# Patient Record
Sex: FEMALE | Race: WHITE | NOT HISPANIC OR LATINO | URBAN - METROPOLITAN AREA
[De-identification: names, ages, dates, MRNs, and addresses within clinical notes are randomized per-mention and may not be internally consistent; named-entity substitution may affect disease eponyms.]

---

## 2019-02-12 ENCOUNTER — FOLLOW UP ESTABLISHED (OUTPATIENT)
Dept: URBAN - METROPOLITAN AREA CLINIC 64 | Facility: CLINIC | Age: 59
End: 2019-02-12
Payer: MEDICARE

## 2019-02-12 PROCEDURE — 92012 INTRM OPH EXAM EST PATIENT: CPT | Performed by: OPTOMETRIST

## 2019-02-12 ASSESSMENT — KERATOMETRY
OD: 47.63
OS: 48.01

## 2019-05-31 ENCOUNTER — FOLLOW UP ESTABLISHED (OUTPATIENT)
Dept: URBAN - METROPOLITAN AREA CLINIC 64 | Facility: CLINIC | Age: 59
End: 2019-05-31
Payer: COMMERCIAL

## 2019-05-31 PROCEDURE — 92015 DETERMINE REFRACTIVE STATE: CPT | Performed by: OPTOMETRIST

## 2019-05-31 PROCEDURE — BRUDE BRUDER EYE MOIST COMPRESS: CUSTOM | Performed by: OPTOMETRIST

## 2019-05-31 PROCEDURE — 92014 COMPRE OPH EXAM EST PT 1/>: CPT | Performed by: OPTOMETRIST

## 2019-05-31 ASSESSMENT — INTRAOCULAR PRESSURE
OS: 14
OD: 14

## 2019-05-31 ASSESSMENT — KERATOMETRY
OS: 48.08
OD: 47.74

## 2019-05-31 ASSESSMENT — VISUAL ACUITY
OD: 20/20
OS: 20/20

## 2020-08-05 ENCOUNTER — FOLLOW UP ESTABLISHED (OUTPATIENT)
Dept: URBAN - METROPOLITAN AREA CLINIC 64 | Facility: CLINIC | Age: 60
End: 2020-08-05
Payer: COMMERCIAL

## 2020-08-05 DIAGNOSIS — H04.123 DRY EYE SYNDROME OF BILATERAL LACRIMAL GLANDS: ICD-10-CM

## 2020-08-05 PROCEDURE — 92014 COMPRE OPH EXAM EST PT 1/>: CPT | Performed by: OPTOMETRIST

## 2020-08-05 PROCEDURE — 92015 DETERMINE REFRACTIVE STATE: CPT | Performed by: OPTOMETRIST

## 2020-08-05 ASSESSMENT — KERATOMETRY
OS: 48.08
OD: 47.57

## 2020-08-05 ASSESSMENT — VISUAL ACUITY
OS: 20/20
OD: 20/20

## 2020-08-05 ASSESSMENT — INTRAOCULAR PRESSURE
OD: 14
OS: 14

## 2020-09-18 ENCOUNTER — NEW PATIENT (OUTPATIENT)
Dept: URBAN - METROPOLITAN AREA CLINIC 64 | Facility: CLINIC | Age: 60
End: 2020-09-18
Payer: MEDICARE

## 2020-09-18 DIAGNOSIS — H11.32 SUBCONJUNCTIVAL HEMORRHAGE OF LEFT EYE: Primary | ICD-10-CM

## 2020-09-18 PROCEDURE — 92002 INTRM OPH EXAM NEW PATIENT: CPT | Performed by: STUDENT IN AN ORGANIZED HEALTH CARE EDUCATION/TRAINING PROGRAM

## 2021-03-05 ENCOUNTER — FOLLOW UP ESTABLISHED (OUTPATIENT)
Dept: URBAN - METROPOLITAN AREA CLINIC 64 | Facility: CLINIC | Age: 61
End: 2021-03-05
Payer: MEDICARE

## 2021-03-05 DIAGNOSIS — H25.13 AGE-RELATED NUCLEAR CATARACT, BILATERAL: Primary | ICD-10-CM

## 2021-03-05 PROCEDURE — 99212 OFFICE O/P EST SF 10 MIN: CPT | Performed by: STUDENT IN AN ORGANIZED HEALTH CARE EDUCATION/TRAINING PROGRAM

## 2021-03-05 ASSESSMENT — INTRAOCULAR PRESSURE
OS: 14
OD: 14

## 2021-10-11 ENCOUNTER — OFFICE VISIT (OUTPATIENT)
Dept: URBAN - METROPOLITAN AREA CLINIC 64 | Facility: CLINIC | Age: 61
End: 2021-10-11
Payer: COMMERCIAL

## 2021-10-11 DIAGNOSIS — H52.223 REGULAR ASTIGMATISM, BILATERAL: Primary | ICD-10-CM

## 2021-10-11 PROCEDURE — 92014 COMPRE OPH EXAM EST PT 1/>: CPT | Performed by: OPTOMETRIST

## 2021-10-11 ASSESSMENT — INTRAOCULAR PRESSURE
OD: 13
OS: 14

## 2021-10-11 ASSESSMENT — VISUAL ACUITY
OD: 20/20
OS: 20/20

## 2021-10-11 ASSESSMENT — KERATOMETRY
OS: 48.11
OD: 47.67

## 2022-04-07 ENCOUNTER — OFFICE VISIT (OUTPATIENT)
Dept: URBAN - METROPOLITAN AREA CLINIC 64 | Facility: CLINIC | Age: 62
End: 2022-04-07
Payer: MEDICARE

## 2022-04-07 DIAGNOSIS — H16.223 KERATOCONJUNCTIVITIS SICCA, BILATERAL: ICD-10-CM

## 2022-04-07 DIAGNOSIS — H11.153 PINGUECULA, BILATERAL: Primary | ICD-10-CM

## 2022-04-07 DIAGNOSIS — H02.34 BLEPHAROCHALASIS OF LEFT UPPER LID: ICD-10-CM

## 2022-04-07 PROCEDURE — BRUDE BRUDER EYE MOIST COMPRESS: CUSTOM | Performed by: OPTOMETRIST

## 2022-04-07 PROCEDURE — 99213 OFFICE O/P EST LOW 20 MIN: CPT | Performed by: OPTOMETRIST

## 2022-04-07 NOTE — IMPRESSION/PLAN
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 Plan: Blepharochalasis OU. She is interested in a surgical consult.

## 2022-04-07 NOTE — IMPRESSION/PLAN
Impression: Pinguecula, bilateral: H11.153. Plan: Pt. ed. on findings. Reassured.   Use UV protection

## 2022-04-07 NOTE — IMPRESSION/PLAN
Impression: Keratoconjunctivitis sicca, bilateral: P77.977. Plan: Continue with PF tears frequently and daily hot compresses. Ok to use vaseline around the eyes for moisturizer.

## 2022-04-27 ENCOUNTER — TESTING ONLY (OUTPATIENT)
Dept: URBAN - METROPOLITAN AREA CLINIC 64 | Facility: CLINIC | Age: 62
End: 2022-04-27
Payer: MEDICARE

## 2022-04-27 DIAGNOSIS — H02.34 BLEPHAROCHALASIS LEFT UPPER EYELID: Primary | ICD-10-CM

## 2022-04-27 PROCEDURE — 92081 LIMITED VISUAL FIELD XM: CPT | Performed by: OPTOMETRIST

## 2022-05-31 ENCOUNTER — TESTING ONLY (OUTPATIENT)
Dept: URBAN - METROPOLITAN AREA CLINIC 64 | Facility: CLINIC | Age: 62
End: 2022-05-31
Payer: MEDICARE

## 2022-05-31 DIAGNOSIS — H02.34 BLEPHAROCHALASIS LEFT UPPER EYELID: Primary | ICD-10-CM

## 2022-05-31 PROCEDURE — 92081 LIMITED VISUAL FIELD XM: CPT | Performed by: OPTOMETRIST

## 2022-10-18 ENCOUNTER — OFFICE VISIT (OUTPATIENT)
Dept: URBAN - METROPOLITAN AREA CLINIC 64 | Facility: CLINIC | Age: 62
End: 2022-10-18
Payer: MEDICARE

## 2022-10-18 DIAGNOSIS — H16.223 KERATOCONJUNCTIVITIS SICCA, BILATERAL: ICD-10-CM

## 2022-10-18 DIAGNOSIS — H02.34 BLEPHAROCHALASIS OF LEFT UPPER LID: Primary | ICD-10-CM

## 2022-10-18 DIAGNOSIS — H52.223 REGULAR ASTIGMATISM, BILATERAL: ICD-10-CM

## 2022-10-18 DIAGNOSIS — H25.13 AGE-RELATED NUCLEAR CATARACT, BILATERAL: ICD-10-CM

## 2022-10-18 DIAGNOSIS — H52.4 PRESBYOPIA: ICD-10-CM

## 2022-10-18 PROCEDURE — 99213 OFFICE O/P EST LOW 20 MIN: CPT | Performed by: OPTOMETRIST

## 2022-10-18 ASSESSMENT — VISUAL ACUITY: OD: 20/20

## 2022-10-18 ASSESSMENT — INTRAOCULAR PRESSURE
OS: 10
OD: 10

## 2022-10-18 NOTE — IMPRESSION/PLAN
Impression: Age-related nuclear cataract, bilateral: H25.13. Plan: Early and mild. No treatment required due to level of vision. Monitor annually.

## 2022-10-18 NOTE — IMPRESSION/PLAN
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 Plan: Blepharochalasis OU. Patient had 59 bleph VF in April 2022 and repeat in May 2022 at patient request. She also had a previous consult with Dr. Maciel Anthony. She is interested in a surgical consult with Dr. Marycruz Kothari.

## 2022-10-18 NOTE — IMPRESSION/PLAN
Impression: Keratoconjunctivitis sicca, bilateral: D44.073. Plan: Discussed. Continue with ATs and warm compresses. She uses Lumify occasionally for redness.

## 2022-12-06 ENCOUNTER — OFFICE VISIT (OUTPATIENT)
Dept: URBAN - METROPOLITAN AREA CLINIC 64 | Facility: CLINIC | Age: 62
End: 2022-12-06
Payer: MEDICARE

## 2022-12-06 DIAGNOSIS — H02.831 DERMATOCHALASIS OF RIGHT UPPER EYELID: Primary | ICD-10-CM

## 2022-12-06 DIAGNOSIS — H02.834 DERMATOCHALASIS OF LEFT UPPER EYELID: ICD-10-CM

## 2022-12-06 DIAGNOSIS — Z41.1 ENCOUNTER FOR COSMETIC SURGERY: ICD-10-CM

## 2022-12-06 PROCEDURE — 99214 OFFICE O/P EST MOD 30 MIN: CPT | Performed by: STUDENT IN AN ORGANIZED HEALTH CARE EDUCATION/TRAINING PROGRAM

## 2022-12-06 PROCEDURE — 92285 EXTERNAL OCULAR PHOTOGRAPHY: CPT | Performed by: STUDENT IN AN ORGANIZED HEALTH CARE EDUCATION/TRAINING PROGRAM

## 2022-12-06 NOTE — IMPRESSION/PLAN
Impression: Encounter for cosmetic surgery: Z41.1. Plan: The patient inquired about improving the appearance of bags and puffiness in the lower eyelids. We discussed cosmetic lower lid blepharoplasty w/ fat removal and repositioning. I explained the dual nature of lower lid bags, specifically the fact that I can improve the fat component via surgery, however the physiologic fluid fluctuation is not amenable to surgery, and will continue. Discussed the R/B/A of this procedure, all questions were answered. The patient inquired about improving fine lines/wrinkles and skin tone in the periorbital region; we discussed cosmetic CO2 laser resurfacing of the region. Patient requested to see Dr. Caitlin Agarwal for second opinion. Will arrange for patient to see Dr. Caitlin Agarwal in [place], next available.

## 2024-02-21 ENCOUNTER — OFFICE VISIT (OUTPATIENT)
Dept: URBAN - METROPOLITAN AREA CLINIC 64 | Facility: LOCATION | Age: 64
End: 2024-02-21
Payer: MEDICARE

## 2024-02-21 DIAGNOSIS — H16.223 KERATOCONJUNCTIVITIS SICCA, BILATERAL: ICD-10-CM

## 2024-02-21 DIAGNOSIS — H02.34 BLEPHAROCHALASIS OF LEFT UPPER LID: ICD-10-CM

## 2024-02-21 DIAGNOSIS — H25.13 AGE-RELATED NUCLEAR CATARACT, BILATERAL: Primary | ICD-10-CM

## 2024-02-21 DIAGNOSIS — H02.831 DERMATOCHALASIS OF RIGHT UPPER EYELID: ICD-10-CM

## 2024-02-21 PROCEDURE — 99214 OFFICE O/P EST MOD 30 MIN: CPT | Performed by: OPTOMETRIST

## 2024-02-21 ASSESSMENT — VISUAL ACUITY
OS: 20/25
OD: 20/25

## 2024-02-21 ASSESSMENT — KERATOMETRY
OD: 47.80
OS: 47.84

## 2024-02-21 ASSESSMENT — INTRAOCULAR PRESSURE
OS: 11
OD: 10

## 2024-03-22 ENCOUNTER — OFFICE VISIT (OUTPATIENT)
Dept: URBAN - METROPOLITAN AREA CLINIC 64 | Facility: LOCATION | Age: 64
End: 2024-03-22
Payer: MEDICARE

## 2024-03-22 DIAGNOSIS — H25.13 AGE-RELATED NUCLEAR CATARACT, BILATERAL: Primary | ICD-10-CM

## 2024-03-22 DIAGNOSIS — H02.34 BLEPHAROCHALASIS LEFT UPPER EYELID: ICD-10-CM

## 2024-03-22 PROCEDURE — 99214 OFFICE O/P EST MOD 30 MIN: CPT | Performed by: STUDENT IN AN ORGANIZED HEALTH CARE EDUCATION/TRAINING PROGRAM

## 2024-03-22 PROCEDURE — 92136 OPHTHALMIC BIOMETRY: CPT | Performed by: STUDENT IN AN ORGANIZED HEALTH CARE EDUCATION/TRAINING PROGRAM

## 2024-03-22 ASSESSMENT — INTRAOCULAR PRESSURE
OS: 8
OD: 10

## 2024-09-25 ENCOUNTER — OFFICE VISIT (OUTPATIENT)
Dept: URBAN - METROPOLITAN AREA CLINIC 10 | Facility: CLINIC | Age: 64
End: 2024-09-25
Payer: MEDICARE

## 2024-09-25 DIAGNOSIS — H02.831 DERMATOCHALASIS OF RIGHT UPPER EYELID: Primary | ICD-10-CM

## 2024-09-25 DIAGNOSIS — Z41.1 ENCOUNTER FOR COSMETIC SURGERY: ICD-10-CM

## 2024-09-25 DIAGNOSIS — H11.442 CONJUNCTIVAL CYSTS, LEFT EYE: ICD-10-CM

## 2024-09-25 DIAGNOSIS — H02.834 DERMATOCHALASIS OF LEFT UPPER EYELID: ICD-10-CM

## 2024-09-25 PROCEDURE — 99213 OFFICE O/P EST LOW 20 MIN: CPT | Performed by: OPHTHALMOLOGY

## 2024-09-25 PROCEDURE — 92082 INTERMEDIATE VISUAL FIELD XM: CPT | Performed by: OPHTHALMOLOGY

## 2024-09-25 PROCEDURE — 92285 EXTERNAL OCULAR PHOTOGRAPHY: CPT | Performed by: OPHTHALMOLOGY
